# Patient Record
Sex: MALE
[De-identification: names, ages, dates, MRNs, and addresses within clinical notes are randomized per-mention and may not be internally consistent; named-entity substitution may affect disease eponyms.]

---

## 2023-06-22 PROBLEM — Z00.00 ENCOUNTER FOR PREVENTIVE HEALTH EXAMINATION: Status: ACTIVE | Noted: 2023-06-22

## 2023-06-27 ENCOUNTER — APPOINTMENT (OUTPATIENT)
Dept: PLASTIC SURGERY | Facility: CLINIC | Age: 38
End: 2023-06-27
Payer: COMMERCIAL

## 2023-06-27 VITALS — HEIGHT: 70 IN | WEIGHT: 228 LBS | BODY MASS INDEX: 32.64 KG/M2

## 2023-06-27 DIAGNOSIS — Z78.9 OTHER SPECIFIED HEALTH STATUS: ICD-10-CM

## 2023-06-27 DIAGNOSIS — S02.40FA ZYGOMATIC FRACTURE, LEFT SIDE, INITIAL ENCOUNTER FOR CLOSED FRACTURE: ICD-10-CM

## 2023-06-27 DIAGNOSIS — S02.32XA FRACTURE OF ORBITAL FLOOR, LEFT SIDE, INITIAL ENCOUNTER FOR CLOSED FRACTURE: ICD-10-CM

## 2023-06-27 PROCEDURE — 99203 OFFICE O/P NEW LOW 30 MIN: CPT

## 2023-06-27 NOTE — PHYSICAL EXAM
[de-identified] : well developed male, NAD [de-identified] : NC/AT, left cheek with TTP over zygoma, no open wounds or lacerations  [de-identified] : PERRL, EOMI, no evidence of entrapment, left periorbital bruising and swelling,  [de-identified] : supple [de-identified] : unlabored breathing  [de-identified] : MUSAR [de-identified] : soft, nontender

## 2023-06-27 NOTE — ASSESSMENT
[FreeTextEntry1] : 38 yo M with left zygomatic and orbital floor fx. No evidence of entrapment. \par \par - images reviewed with Dr. Cruz\par - recommend CT MAx Facial with thin cuts \par - soft diet \par - HOB elevation\par - all questions answered \par - f/u after study for surgical planning

## 2023-06-27 NOTE — HISTORY OF PRESENT ILLNESS
[FreeTextEntry1] : 38 yo otherwise healthy M with no significant PMHx who presents today for evaluation of left facial injury sustained 8 days ago when he was accidentally struck in the face by another player's elbow while playing basketball. Pt reported to [facility] the following day with left facial pain and swelling. Workup revealed left zygomatic and orbital floor fractures. \par \par Pt here c/o left facial swelling, bruising, painful mastication and numbness of left cheek, gum and upper lip. Denies any diplopia or blurry vision. \par \par \par Occupation - teacher\par Nonsmoker

## 2025-02-02 ENCOUNTER — NON-APPOINTMENT (OUTPATIENT)
Age: 40
End: 2025-02-02